# Patient Record
Sex: FEMALE | URBAN - METROPOLITAN AREA
[De-identification: names, ages, dates, MRNs, and addresses within clinical notes are randomized per-mention and may not be internally consistent; named-entity substitution may affect disease eponyms.]

---

## 2020-03-14 ENCOUNTER — VIRTUAL VISIT (OUTPATIENT)
Dept: FAMILY MEDICINE | Facility: OTHER | Age: 40
End: 2020-03-14

## 2023-05-02 NOTE — PROGRESS NOTES
"Date: 2020 15:54:04  Clinician: Katerin Isidro  Clinician NPI: [id number]  Patient: Tiffany Trujillo  Patient : 1980  Patient Address: [address] [place] [address]  Patient Phone: [phone]  Visit Protocol: URI  Patient Summary:  Tiffany is a 39 year old ( : 1980 ) female who initiated a Visit for cold, sinus infection, or influenza. When asked the question \"Please sign me up to receive news, health information and promotions from [employer].\", Tiffany responded \"No\".    Tiffany states her symptoms started 1-2 days ago.   Her symptoms consist of malaise, rhinitis, facial pain or pressure, myalgia, wheezing, a sore throat, a cough, nasal congestion, and a headache. She is experiencing difficulty breathing due to nasal congestion but she is not short of breath. Tiffany also feels feverish.   Symptom details     Nasal secretions: The color of her mucus is white and yellow.    Cough: Tiffany coughs a few times an hour and her cough is not more bothersome at night. Phlegm does not come into her throat when she coughs. She believes her cough is caused by post-nasal drip.     Sore throat: Tiffany reports having mild throat pain (1-3 on a 10 point pain scale), does not have exudate on her tonsils, and can swallow liquids. The lymph nodes in her neck are not enlarged. A rash has not appeared on the skin since the sore throat started.     Temperature: Her current temperature is 99.1 degrees Fahrenheit.     Wheezing: Tiffany has not ever been diagnosed with asthma. The wheezing does not interfere with her normal daily activities.    Facial pain or pressure: The facial pain or pressure does not feel worse when bending or leaning forward.     Headache: She states the headache is mild (1-3 on a 10 point pain scale).      Tiffany denies having ear pain, enlarged lymph nodes, chills, and teeth pain. She also denies taking antibiotic medication for the symptoms, having a sinus infection within the past " year, and having recent facial or sinus surgery in the past 60 days.   Precipitating events  Within the past week, Tiffany has not been exposed to someone with strep throat. She has not recently been exposed to someone with influenza. Tiffany has been in close contact with the following high risk individuals: adults 65 or older.   Pertinent COVID-19 (Coronavirus) information  Tiffany has not traveled internationally or to the areas where COVID-19 (Coronavirus) is widespread in the last 14 days before the start of her symptoms.   Tiffany has not had close contact with a suspected or laboratory-confirmed COVID-19 patient within 14 days of symptom onset.   Tiffany is not a healthcare worker and does not work in a healthcare facility.   Pertinent medical history  Tiffany does not get yeast infections when she takes antibiotics.   Tiffany does not need a return to work/school note.   Weight: 180 lbs   Tiffany does not smoke or use smokeless tobacco.   She denies pregnancy and denies breastfeeding. She has menstruated in the past month.   Weight: 180 lbs    MEDICATIONS: No current medications, ALLERGIES: NKDA  Clinician Response:  Dear Tiffany,  Based on the information provided, you have viral sinusitis, also known as a sinus infection. Sinus infections are caused by bacteria or a virus and symptoms are almost always identical. The difference between the 2 types of infections is timing.  Sinus infections start as viral infections and symptoms improve on their own in about 7 days. If symptoms have not improved after 7 days or have even worsened, a bacterial infection may have developed.  Medication information  Because you have a viral infection, antibiotics will not help you get better. Treating a viral infection with antibiotics could actually make you feel worse.  Unless you are allergic to the over-the-counter medication(s) below, I recommend using:       Acetaminophen (Tylenol or store brand) oral tablet. Take 1-2  tablets by mouth every 4-6 hours to help with the discomfort.      Ibuprofen (Advil or store brand) 200 mg oral tablet. Take 1-3 tablets (200-600 mg) by mouth every 8 hours to help with the discomfort. Make sure to take the ibuprofen with food. Do not exceed 2400 mg in 24 hours.    An antihistamine such as Benadryl, Claritin, or store brand.    A decongestant such as Sudafed PE or store brand.     Over-the-counter medications do not require a prescription. Ask the pharmacist if you have any questions.  Self care  The following tips will keep you as comfortable as possible while you recover:     Rest    Drink plenty of water and other liquids    Take a hot shower to loosen congestion    Use throat lozenges    Gargle with warm salt water (1/4 teaspoon of salt per 8 ounce glass of water)    Suck on frozen items such as popsicles or ice cubes    Drink hot tea with lemon and honey    Take a spoonful of honey to reduce your cough     When to seek care  Please be seen in a clinic or urgent care if new symptoms develop, or symptoms become worse.  Call 911 or go to the emergency room if you feel that your throat is closing off, you suddenly develop a rash, you are unable to swallow fluids, you are drooling, or you are having difficulty breathing.  COVID-19 (Coronavirus) General Information  With the increase in the number of COVID-19 (Coronavirus) cases, we understand you may have some questions. Below is some helpful information on COVID-19 (Coronavirus).  How can I protect myself and others from the COVID-19 (Coronavirus)?  Because there is currently no vaccine to prevent infection, the best way to protect yourself is to avoid being exposed to this virus. Put distance between yourself and other people if COVID-19 (Coronavirus) is spreading in your community. The virus is thought to spread mainly from person-to-person.     Between people who are in close contact with one another (within about 6 about) for prolonged period  (10 minutes or longer).    Through respiratory droplets produced when an infected person coughs or sneezes.     The CDC recommends the following additional steps to protect yourself and others:     Wash your hands often with soap and water for at least 20 seconds, especially after blowing your nose, coughing, or sneezing; going to the bathroom; and before eating or preparing food.  Use an alcohol-based hand  that contains at least 60 percent alcohol if soap and water are not available.        Avoid touching your eyes, nose and mouth with unwashed hands.    Avoid close contact with people who are sick.    Stay home when you are sick.    Cover your cough or sneeze with a tissue, then throw the tissue in the trash.    Clean and disinfect frequently touched objects and surfaces.     You can help stop COVID-19 (Coronavirus) by knowing the signs and symptoms:     Fever    Cough    Shortness of breath     Contact your healthcare provider if   Develop symptoms   AND   Have been in close contact with a person known to have COVID-19 (Coronavirus) or live in or have recently traveled from an area with ongoing spread of COVID-19 (Coronavirus). Call ahead before you go to a doctor's office or emergency room. Tell them about your recent travel and your symptoms.   For the most up to date information, visit the CDC's website.  Steps to help prevent the spread of COVID-19 (Coronavirus) if you are sick  If you are sick with COVID-19 (Coronavirus) or suspect you are infected with the virus that causes COVID-19 (Coronavirus), follow the steps below to help prevent the disease from spreading&nbsp;to people in your home and community.     Stay home except to get medical care. Home isolation may be started in consultation with your healthcare clinician.    Separate yourself from other people and animals in your home.    Call ahead before visiting your doctor if you have a medical appointment.    Wear a facemask when you are  "around other people.    Cover your cough and sneezes.    Clean your hands often.    Avoid sharing personal household items.    Clean and disinfect frequently touched objects and surfaces everyday.    You will need to have someone drop off medications or household supplies (if needed) at your house without coming inside or in contact with you or others living in your house.    Monitor your symptoms and seek prompt medical care if your illness is worsening (e.g. Difficulty breathing).    Discontinue home isolation only in consultation with your healthcare provider.     For more detailed and up to date information on what to do if you are sick, visit this link: What to Do If You Are Sick With Coronavirus Disease 2019 (COVID-19).  Do I need to be tested for COVID-19 (Coronavirus)?     At this time, the limited number of tests available are controlled by the state and local health departments and are being reserved for more seriously ill patients, those with known exposure to confirmed patients, and those with recent travel (within 14 days) to countries with high rates of COVID-19 (Coronavirus).    Decisions on which patients receive testing will be based on the local spread of COVID-19 (Coronavirus) as well as the symptoms. Your healthcare provider will make the final decision on whether you should be tested.    In the meantime, if you have concerns that you may have been exposed, it is reasonable to practice \"social distancing.\"&nbsp; If you are ill with a cold or flu-like illness, please monitor your symptoms and reach out to your healthcare provider if your symptoms worsen.    For more up to date information, visit this link: COVID-19 (Coronavirus) Frequently Asked Questions and Answers.      Diagnosis: Viral sinusitis  Diagnosis ICD: J01.90  " Acitretin Pregnancy And Lactation Text: This medication is Pregnancy Category X and should not be given to women who are pregnant or may become pregnant in the future. This medication is excreted in breast milk.